# Patient Record
Sex: MALE | Race: BLACK OR AFRICAN AMERICAN | NOT HISPANIC OR LATINO | Employment: FULL TIME | URBAN - METROPOLITAN AREA
[De-identification: names, ages, dates, MRNs, and addresses within clinical notes are randomized per-mention and may not be internally consistent; named-entity substitution may affect disease eponyms.]

---

## 2017-04-11 ENCOUNTER — OFFICE VISIT (OUTPATIENT)
Dept: FAMILY MEDICINE | Facility: CLINIC | Age: 32
End: 2017-04-11
Payer: COMMERCIAL

## 2017-04-11 VITALS
BODY MASS INDEX: 30.85 KG/M2 | WEIGHT: 191.94 LBS | TEMPERATURE: 99 F | DIASTOLIC BLOOD PRESSURE: 84 MMHG | SYSTOLIC BLOOD PRESSURE: 130 MMHG | OXYGEN SATURATION: 98 % | HEIGHT: 66 IN | HEART RATE: 66 BPM

## 2017-04-11 DIAGNOSIS — Z13.1 SCREENING FOR DIABETES MELLITUS: ICD-10-CM

## 2017-04-11 DIAGNOSIS — Z23 NEED FOR DIPHTHERIA-TETANUS-PERTUSSIS (TDAP) VACCINE, ADULT/ADOLESCENT: Primary | ICD-10-CM

## 2017-04-11 DIAGNOSIS — Z13.220 SCREENING FOR HYPERLIPIDEMIA: ICD-10-CM

## 2017-04-11 DIAGNOSIS — E66.3 OVERWEIGHT(278.02): Chronic | ICD-10-CM

## 2017-04-11 PROCEDURE — 99999 PR PBB SHADOW E&M-EST. PATIENT-LVL III: CPT | Mod: PBBFAC,,, | Performed by: INTERNAL MEDICINE

## 2017-04-11 PROCEDURE — 90715 TDAP VACCINE 7 YRS/> IM: CPT | Mod: S$GLB,,, | Performed by: INTERNAL MEDICINE

## 2017-04-11 PROCEDURE — 90471 IMMUNIZATION ADMIN: CPT | Mod: S$GLB,,, | Performed by: INTERNAL MEDICINE

## 2017-04-11 PROCEDURE — 99213 OFFICE O/P EST LOW 20 MIN: CPT | Mod: 25,S$GLB,, | Performed by: INTERNAL MEDICINE

## 2017-04-11 PROCEDURE — 1160F RVW MEDS BY RX/DR IN RCRD: CPT | Mod: S$GLB,,, | Performed by: INTERNAL MEDICINE

## 2017-04-11 NOTE — PROGRESS NOTES
Assessment & Plan  Need for diphtheria-tetanus-pertussis (Tdap) vaccine, adult/adolescent  -     Tdap Vaccine    Screening for diabetes mellitus-return for future labs  -     Basic metabolic panel; Future; Expected date: 4/11/17    Screening for hyperlipidemia  -     Lipid panel; Future; Expected date: 4/11/17    Overweight-work on diet and exercise      There are no discontinued medications.    Follow-up: No Follow-up on file. if labs normal, plan for physical exam 2 years      =================================================================      Chief Complaint   Patient presents with    [facility]       [facility]  Minor is a 31 y.o. male, last appointment with this clinic was Visit date not found.    Needs Tdap - girlfriend expecting at the end of May.  His first child.  Overweight.  The previous lost weight but with work time constraints, has been gaining weight.  We talked about the benefits of weight loss and the consultations of weight over time including risk for diabetes, joint diseases, etc.  It's been years since his last checkup.  Recommended screening for diabetes and screening lipid.  He will return for fasting labs.    Patient Care Team:  Anton Rodríguez MD as PCP - General (Internal Medicine)    Patient Active Problem List    Diagnosis Date Noted    Overweight 04/11/2017       PAST MEDICAL HISTORY:  History reviewed. No pertinent past medical history.    PAST SURGICAL HISTORY:  Past Surgical History:   Procedure Laterality Date    HERNIA REPAIR      right groin    WISDOM TOOTH EXTRACTION       Family History   Problem Relation Age of Onset    No Known Problems Mother     No Known Problems Father     Cancer Maternal Grandmother      breast    Hypertension Maternal Grandmother     No Known Problems Brother        SOCIAL HISTORY:  Social History     Social History    Marital status: Single     Spouse name: N/A    Number of children: N/A    Years of education: N/A     Occupational History      "[employer]     Social History Main Topics    Smoking status: Never Smoker    Smokeless tobacco: Not on file    Alcohol use Yes      Comment: 3 drinks once weekly    Drug use: No    Sexual activity: Not on file     Other Topics Concern    Not on file     Social History Narrative    No narrative on file       ALLERGIES AND MEDICATIONS: updated and reviewed.  Review of patient's allergies indicates:  No Known Allergies  No current outpatient prescriptions on file.     No current facility-administered medications for this visit.        Review of Systems   Constitutional: Negative for fever, malaise/fatigue and weight loss.   HENT: Negative for congestion.    Eyes: Negative for blurred vision and pain.   Respiratory: Negative for shortness of breath and wheezing.    Cardiovascular: Negative for chest pain, palpitations and leg swelling.   Gastrointestinal: Negative for abdominal pain, blood in stool, constipation, diarrhea and heartburn.   Genitourinary: Negative for dysuria, hematuria and urgency.   Musculoskeletal: Negative for joint pain.   Neurological: Negative for tingling, focal weakness, weakness and headaches.   Psychiatric/Behavioral: Negative for depression. The patient is not nervous/anxious.        Physical Exam   Vitals:    04/11/17 1124   BP: 130/84   Pulse: 66   Temp: 98.9 °F (37.2 °C)   SpO2: 98%   Weight: 87.1 kg (191 lb 14.6 oz)   Height: 5' 6" (1.676 m)    Body mass index is 30.98 kg/(m^2).  Weight: 87.1 kg (191 lb 14.6 oz)   Height: 5' 6" (167.6 cm)     Physical Exam   Constitutional: He is oriented to person, place, and time. He appears well-developed and well-nourished. No distress.   Eyes: EOM are normal.   Cardiovascular: Normal rate, regular rhythm and normal heart sounds.    No murmur heard.  Musculoskeletal: Normal range of motion.   Neurological: He is alert and oriented to person, place, and time. Coordination normal.   Skin: Skin is warm and dry.   Psychiatric: He has a normal mood " and affect. His behavior is normal. Thought content normal.

## 2017-04-17 ENCOUNTER — LAB VISIT (OUTPATIENT)
Dept: LAB | Facility: HOSPITAL | Age: 32
End: 2017-04-17
Attending: INTERNAL MEDICINE
Payer: COMMERCIAL

## 2017-04-17 DIAGNOSIS — Z13.220 SCREENING FOR HYPERLIPIDEMIA: ICD-10-CM

## 2017-04-17 DIAGNOSIS — Z13.1 SCREENING FOR DIABETES MELLITUS: ICD-10-CM

## 2017-04-17 LAB
ANION GAP SERPL CALC-SCNC: 9 MMOL/L
BUN SERPL-MCNC: 15 MG/DL
CALCIUM SERPL-MCNC: 9.3 MG/DL
CHLORIDE SERPL-SCNC: 103 MMOL/L
CHOLEST/HDLC SERPL: 4.4 {RATIO}
CO2 SERPL-SCNC: 28 MMOL/L
CREAT SERPL-MCNC: 0.9 MG/DL
EST. GFR  (AFRICAN AMERICAN): >60 ML/MIN/1.73 M^2
EST. GFR  (NON AFRICAN AMERICAN): >60 ML/MIN/1.73 M^2
GLUCOSE SERPL-MCNC: 90 MG/DL
HDL/CHOLESTEROL RATIO: 22.9 %
HDLC SERPL-MCNC: 153 MG/DL
HDLC SERPL-MCNC: 35 MG/DL
LDLC SERPL CALC-MCNC: 100 MG/DL
NONHDLC SERPL-MCNC: 118 MG/DL
POTASSIUM SERPL-SCNC: 4 MMOL/L
SODIUM SERPL-SCNC: 140 MMOL/L
TRIGL SERPL-MCNC: 90 MG/DL

## 2017-04-17 PROCEDURE — 80061 LIPID PANEL: CPT

## 2017-04-17 PROCEDURE — 80048 BASIC METABOLIC PNL TOTAL CA: CPT

## 2017-04-17 PROCEDURE — 36415 COLL VENOUS BLD VENIPUNCTURE: CPT | Mod: PO

## 2019-08-06 ENCOUNTER — OFFICE VISIT (OUTPATIENT)
Dept: URGENT CARE | Facility: CLINIC | Age: 34
End: 2019-08-06
Payer: OTHER MISCELLANEOUS

## 2019-08-06 VITALS
DIASTOLIC BLOOD PRESSURE: 82 MMHG | HEART RATE: 74 BPM | OXYGEN SATURATION: 99 % | RESPIRATION RATE: 16 BRPM | SYSTOLIC BLOOD PRESSURE: 138 MMHG | TEMPERATURE: 98 F | BODY MASS INDEX: 31.34 KG/M2 | HEIGHT: 66 IN | WEIGHT: 195 LBS

## 2019-08-06 DIAGNOSIS — S49.91XA INJURY OF RIGHT SHOULDER, INITIAL ENCOUNTER: Primary | ICD-10-CM

## 2019-08-06 DIAGNOSIS — Z02.6 ENCOUNTER RELATED TO WORKER'S COMPENSATION CLAIM: ICD-10-CM
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
## 2019-08-06 LAB
CTP QC/QA: YES
POC 10 PANEL DRUG SCREEN: NEGATIVE

## 2019-08-06 PROCEDURE — 73030 X-RAY EXAM OF SHOULDER: CPT | Mod: FY,RT,S$GLB, | Performed by: RADIOLOGY

## 2019-08-06 PROCEDURE — 99203 OFFICE O/P NEW LOW 30 MIN: CPT | Mod: S$GLB,,, | Performed by: NURSE PRACTITIONER

## 2019-08-06 PROCEDURE — 80305 DRUG TEST PRSMV DIR OPT OBS: CPT | Mod: QW,S$GLB,, | Performed by: NURSE PRACTITIONER

## 2019-08-06 PROCEDURE — 99203 PR OFFICE/OUTPT VISIT, NEW, LEVL III, 30-44 MIN: ICD-10-PCS | Mod: S$GLB,,, | Performed by: NURSE PRACTITIONER

## 2019-08-06 PROCEDURE — 73030 XR SHOULDER TRAUMA 3 VIEW RIGHT: ICD-10-PCS | Mod: FY,RT,S$GLB, | Performed by: RADIOLOGY

## 2019-08-06 PROCEDURE — 80305 POCT RAPID DRUG SCREEN 10 PANEL: ICD-10-PCS | Mod: QW,S$GLB,, | Performed by: NURSE PRACTITIONER

## 2019-08-06 NOTE — LETTER
[facility] [place]  [address]  [place] [address]  Phone: [phone]  Fax: [phone]  [facility]: [phone]    Pt Name: Minor Quan  Injury Date: 08/06/2019   Employee ID:  Date of First Treatment: 08/06/2019   Company: [employer] reference to record [id number]      Appointment Time: 01:25 PM Arrived: 1:49 PM   Provider: Sveta Toney NP Time Out:2:47 PM     Office Treatment:   1. Injury of right shoulder, initial encounter    2. Work related injury    3. Encounter related to worker's compensation claim                     Return Appointment: TBD by [facility]

## 2019-08-06 NOTE — PROGRESS NOTES
Subjective:       Patient ID: Minor Quan is a 34 y.o. male.    Chief Complaint: Shoulder Injury    Patient is a  at [employer]. Today, around 8:00 am patient was on a ladder when the ladder began to slide. Patient grabbed gutters with both hands to attempt to stop his fall. Patient slipped and fell onto right shoulder. Patient states his shoulder popped out of the socket and when he stood up it popped back into place. Patient applied ice to injured shoulder.  Patient denies any numbness or tingling in the associated shoulder.  Patient has limited range of motion with minimal pain.  Patient has never injured his shoulder previously.    Shoulder Injury    The incident occurred at work. The incident occurred 3 to 6 hours ago. The injury mechanism was a fall. The quality of the pain is described as aching and burning. The pain radiates to the right arm. The pain is at a severity of 4/10. The pain is moderate. Associated symptoms include tingling. The symptoms are aggravated by movement and overhead lifting. He has tried ice for the symptoms. The treatment provided no relief.     Review of Systems   Constitution: Negative.   HENT: Negative.    Eyes: Negative.    Cardiovascular: Negative.    Respiratory: Negative.    Endocrine: Negative.    Hematologic/Lymphatic: Negative.    Skin: Negative.    Musculoskeletal: Positive for joint pain.   Gastrointestinal: Negative.    Genitourinary: Negative.    Neurological: Positive for tingling.   Psychiatric/Behavioral: Negative.    Allergic/Immunologic: Negative.        Objective:      Physical Exam   Constitutional: He is oriented to person, place, and time. He appears well-developed and well-nourished. He is cooperative.  Non-toxic appearance. He does not appear ill. No distress.   HENT:   Head: Normocephalic and atraumatic. Head is without abrasion, without contusion and without laceration.   Right Ear: Hearing, tympanic membrane, external ear and ear  canal normal. No hemotympanum.   Left Ear: Hearing, tympanic membrane, external ear and ear canal normal. No hemotympanum.   Nose: Nose normal. No mucosal edema, rhinorrhea or nasal deformity. No epistaxis. Right sinus exhibits no maxillary sinus tenderness and no frontal sinus tenderness. Left sinus exhibits no maxillary sinus tenderness and no frontal sinus tenderness.   Mouth/Throat: Uvula is midline, oropharynx is clear and moist and mucous membranes are normal. No trismus in the jaw. Normal dentition. No uvula swelling. No posterior oropharyngeal erythema.   Eyes: Pupils are equal, round, and reactive to light. Conjunctivae, EOM and lids are normal. Right eye exhibits no discharge. Left eye exhibits no discharge. No scleral icterus.   Sclera clear bilat   Neck: Trachea normal, normal range of motion, full passive range of motion without pain and phonation normal. Neck supple. No spinous process tenderness and no muscular tenderness present. No neck rigidity. No tracheal deviation present.   Cardiovascular: Normal rate, regular rhythm, normal heart sounds, intact distal pulses and normal pulses.   Pulses:       Radial pulses are 2+ on the right side, and 2+ on the left side.   Pulmonary/Chest: Effort normal and breath sounds normal. No respiratory distress.   Abdominal: Soft. Normal appearance and bowel sounds are normal. He exhibits no distension, no pulsatile midline mass and no mass. There is no tenderness.   Musculoskeletal: He exhibits no edema or deformity.        Right shoulder: He exhibits decreased range of motion, bony tenderness, pain and decreased strength. He exhibits no swelling, no effusion, no crepitus, no deformity, no laceration, no spasm and normal pulse.        Arms:  Neurological: He is alert and oriented to person, place, and time. He has normal strength. No cranial nerve deficit or sensory deficit. He exhibits normal muscle tone. He displays no seizure activity. Coordination normal. GCS  eye subscore is 4. GCS verbal subscore is 5. GCS motor subscore is 6.   Skin: Skin is warm, dry and intact. Capillary refill takes less than 2 seconds. No abrasion, no bruising, no burn, no ecchymosis and no laceration noted. He is not diaphoretic. No pallor.   Psychiatric: He has a normal mood and affect. His speech is normal and behavior is normal. Judgment and thought content normal. Cognition and memory are normal.   Nursing note and vitals reviewed.        Xr Shoulder Trauma 3 View Right    Result Date: 8/6/2019  EXAMINATION: XR SHOULDER TRAUMA 3 VIEW RIGHT CLINICAL HISTORY: Unspecified injury of right shoulder and upper arm, initial encounter TECHNIQUE: Three or four views of the right shoulder were performed. COMPARISON: None FINDINGS: Three views. The right humeral head maintains appropriate relationship with the glenoid.  The acromioclavicular joint is intact.  No acute displaced right rib fracture.  The right lung zones are grossly clear.     1. No acute displaced fracture or dislocation of the right shoulder. Electronically signed by: Bryson Anderson MD Date:    08/06/2019 Time:    14:38    Assessment:       1. Injury of right shoulder, initial encounter    2. Work related injury    3. Encounter related to worker's compensation claim        Plan:         Patient reports he is unable to perform full activity tomorrow.  Patient is advised to follow up outpatient health.  Patient is placed in a sling today.  Advised on the use of ice as well as NSAIDs.  Patient verbalized understanding and is in agreement to treatment plan.    Patient Instructions     Shoulder Sprain  A sprain is a stretching or tearing of the ligaments that hold a joint together. A sprain may take up to 8 weeks to fully heal, depending on how severe it is. Moderate to severe shoulder sprains are treated with a sling or shoulder immobilizer. Minor sprains can be treated without any special support.  Home care  The following guidelines will  help you care for your injury at home:  · If a sling was given to you, leave it in place for the time advised by your healthcare provider. If you arent sure how long to wear it, ask for advice. If the sling becomes loose, adjust it so that your forearm is level with the ground. Your shoulder should feel well supported.  · Put an ice pack on the injured area for 20 minutes every 1 to 2 hours the first day. You can make your own ice pack by putting ice cubes in a plastic bag. A bag of frozen peas or something similar works well too. Wrap the bag in a thin towel. Continue with ice packs 3 to 4 times a day for the next 2 to 3 days. Then use the pack as needed to ease pain and swelling.  · You may use acetaminophen or ibuprofen to control pain, unless another pain medicine was prescribed. If you have chronic liver or kidney disease, talk with your healthcare provider before using these medicines. Also talk with your provider if youve had a stomach ulcer or gastrointestinal bleeding.  · Shoulder joints become stiff if left in a sling for too long. You should start range of motion exercises about 7 to 10 days after the injury. Talk with your provider to find out what type of exercises to do and how soon to start.  Follow-up care  Follow up with your healthcare provider, or as advised.  Any X-rays you had today dont show any broken bones, breaks, or fractures. Sometimes fractures dont show up on the first X-ray. Bruises and sprains can sometimes hurt as much as a fracture. These injuries can take time to heal completely. If your symptoms dont improve or they get worse, talk with your provider. You may need a repeat X-ray or other treatments.  When to seek medical advice  Call your healthcare provider right away if any of these occur:  · Shoulder pain or swelling in your arm that gets worse  · Fingers become cold, blue, numb, or tingly  · Large amount of bruising of the shoulder or upper arm  · Fever or chills  Date  Last Reviewed: 8/1/2016  © 2245-1485 [employer]. [address] [place] [address], [place] [address]. All rights reserved. This information is not intended as a substitute for professional medical care. Always follow your healthcare professional's instructions.    -Ibuprofen as needed for pain.  -Ice to the affected area 10-20 mins on and off.  -Wear the sling until follow up with Occupational health tomorrow.  -Follow up with occupational health tomorrow.                    Follow up if symptoms worsen or fail to improve.

## 2019-08-06 NOTE — PATIENT INSTRUCTIONS
Shoulder Sprain  A sprain is a stretching or tearing of the ligaments that hold a joint together. A sprain may take up to 8 weeks to fully heal, depending on how severe it is. Moderate to severe shoulder sprains are treated with a sling or shoulder immobilizer. Minor sprains can be treated without any special support.  Home care  The following guidelines will help you care for your injury at home:  · If a sling was given to you, leave it in place for the time advised by your healthcare provider. If you arent sure how long to wear it, ask for advice. If the sling becomes loose, adjust it so that your forearm is level with the ground. Your shoulder should feel well supported.  · Put an ice pack on the injured area for 20 minutes every 1 to 2 hours the first day. You can make your own ice pack by putting ice cubes in a plastic bag. A bag of frozen peas or something similar works well too. Wrap the bag in a thin towel. Continue with ice packs 3 to 4 times a day for the next 2 to 3 days. Then use the pack as needed to ease pain and swelling.  · You may use acetaminophen or ibuprofen to control pain, unless another pain medicine was prescribed. If you have chronic liver or kidney disease, talk with your healthcare provider before using these medicines. Also talk with your provider if youve had a stomach ulcer or gastrointestinal bleeding.  · Shoulder joints become stiff if left in a sling for too long. You should start range of motion exercises about 7 to 10 days after the injury. Talk with your provider to find out what type of exercises to do and how soon to start.  Follow-up care  Follow up with your healthcare provider, or as advised.  Any X-rays you had today dont show any broken bones, breaks, or fractures. Sometimes fractures dont show up on the first X-ray. Bruises and sprains can sometimes hurt as much as a fracture. These injuries can take time to heal completely. If your symptoms dont improve or they get  worse, talk with your provider. You may need a repeat X-ray or other treatments.  When to seek medical advice  Call your healthcare provider right away if any of these occur:  · Shoulder pain or swelling in your arm that gets worse  · Fingers become cold, blue, numb, or tingly  · Large amount of bruising of the shoulder or upper arm  · Fever or chills  Date Last Reviewed: 8/1/2016  © 3608-2299 [employer]. [address] [place] [address], [place] [address]. All rights reserved. This information is not intended as a substitute for professional medical care. Always follow your healthcare professional's instructions.    -Ibuprofen as needed for pain.  -Ice to the affected area 10-20 mins on and off.  -Wear the sling until follow up with Occupational health tomorrow.  -Follow up with occupational health tomorrow.